# Patient Record
Sex: MALE | Race: WHITE | NOT HISPANIC OR LATINO | URBAN - METROPOLITAN AREA
[De-identification: names, ages, dates, MRNs, and addresses within clinical notes are randomized per-mention and may not be internally consistent; named-entity substitution may affect disease eponyms.]

---

## 2018-07-01 ENCOUNTER — EMERGENCY (EMERGENCY)
Facility: HOSPITAL | Age: 23
LOS: 1 days | Discharge: HOME | End: 2018-07-01
Attending: STUDENT IN AN ORGANIZED HEALTH CARE EDUCATION/TRAINING PROGRAM
Payer: SELF-PAY

## 2018-07-01 VITALS
SYSTOLIC BLOOD PRESSURE: 135 MMHG | DIASTOLIC BLOOD PRESSURE: 78 MMHG | OXYGEN SATURATION: 97 % | HEART RATE: 76 BPM | TEMPERATURE: 98 F | RESPIRATION RATE: 18 BRPM

## 2018-07-01 VITALS
DIASTOLIC BLOOD PRESSURE: 88 MMHG | OXYGEN SATURATION: 95 % | WEIGHT: 160.06 LBS | RESPIRATION RATE: 18 BRPM | TEMPERATURE: 98 F | HEART RATE: 80 BPM | HEIGHT: 69 IN | SYSTOLIC BLOOD PRESSURE: 142 MMHG

## 2018-07-01 LAB — ETHANOL SERPL-MCNC: 332 MG/DL — SIGNIFICANT CHANGE UP

## 2018-07-01 PROCEDURE — 72125 CT NECK SPINE W/O DYE: CPT

## 2018-07-01 PROCEDURE — 99285 EMERGENCY DEPT VISIT HI MDM: CPT | Mod: 25

## 2018-07-01 PROCEDURE — 36415 COLL VENOUS BLD VENIPUNCTURE: CPT

## 2018-07-01 PROCEDURE — 80307 DRUG TEST PRSMV CHEM ANLYZR: CPT

## 2018-07-01 PROCEDURE — 72125 CT NECK SPINE W/O DYE: CPT | Mod: 26

## 2018-07-01 PROCEDURE — 99284 EMERGENCY DEPT VISIT MOD MDM: CPT

## 2018-07-01 PROCEDURE — 70450 CT HEAD/BRAIN W/O DYE: CPT | Mod: 26

## 2018-07-01 PROCEDURE — 70450 CT HEAD/BRAIN W/O DYE: CPT

## 2018-07-01 NOTE — ED PROVIDER NOTE - MUSCULOSKELETAL, MLM
Spine appears normal, range of motion is not limited, no muscle or joint tenderness.  FROM all extremities x4.

## 2018-07-01 NOTE — ED ADULT NURSE REASSESSMENT NOTE - NS ED NURSE REASSESS COMMENT FT1
POC discussed with physician, pt to be discharged pending CT results, MD reports speaking with patient parents and Dad is to come pick pt up pending CT results.

## 2018-07-01 NOTE — ED ADULT TRIAGE NOTE - CHIEF COMPLAINT QUOTE
pt a+ox3, ALOOB admits to drinking alcohol today. pt states "I don't know who called or how I got here. I was on [place] drinking." pt denies any chest pain. clothing removed and placed in labeled belongings bag and secured in  closet. bottom lower lip swollen, pt states I don't know how it happened.

## 2018-07-01 NOTE — ED PROVIDER NOTE - ENMT, MLM
Moist mucous membranes.  Head: bruise to right cheek and left forehead.  Swelling to right lower lip, otherwise no facial tenderness

## 2018-07-01 NOTE — ED ADULT NURSE REASSESSMENT NOTE - NS ED NURSE REASSESS COMMENT FT1
MD at pt bedside test results explained to pt and parent who verbalize understanding, pt discharge to father care at this time, VSS, wheelchair provided.

## 2018-07-01 NOTE — ED PROVIDER NOTE - OBJECTIVE STATEMENT
21 y/o M, BIBA for alcohol intoxication from [place].  Pt states that he is unsure how or why he is in the hospital.  Also states that he is unsure how he sustained bruising and swelling to face.  Admits to drinking alcohol today.  Denies any physical complaints at this time including chest pain, facial pain, N/V/D, SOB, cough, or HA.

## 2018-07-01 NOTE — ED ADULT NURSE NOTE - CHIEF COMPLAINT QUOTE
pt a+ox3, ALOOB admits to drinking alcohol today. pt states "I don't know who called or how I got here. I was on [place] drinking." pt denies any chest pain. clothing removed and placed in labeled belongings bag and secured in  closet. bottom lower lip swollen, pt states I don't know how it happened.

## 2018-07-01 NOTE — ED PROVIDER NOTE - CARE PLAN
Principal Discharge DX:	Alcohol intoxication  Secondary Diagnosis:	Closed head injury, initial encounter

## 2018-07-01 NOTE — ED ADULT NURSE NOTE - OBJECTIVE STATEMENT
Pt reports "Shit happens then I ended up here", admits to drinking "only 3 drinks", alcohol on breath, bruises noted to pt right cheek and bottom lip, pt unable to re-call [facility], pt denies pain, offers no complaints, pleasant and cooperative to care, MD at pt bedside, POC discussed with pt, pt to go for a CT at this time

## 2018-07-01 NOTE — ED PROVIDER NOTE - CONSTITUTIONAL, MLM
normal... Well appearing, well nourished, awake, alert, oriented to person, place, unclear of situation and in no apparent distress.

## 2018-07-01 NOTE — ED ADULT NURSE REASSESSMENT NOTE - NS ED NURSE REASSESS COMMENT FT1
Pt father just arrived, POC discussed with Dad who verbalize understanding, MD made aware, awaiting MD to bed side.

## 2018-07-01 NOTE — ED PROVIDER NOTE - PROGRESS NOTE DETAILS
Pt's mother [phone] Called pt's family, message left to call back, pt will need to remain in ED pending sobriety

## 2018-07-02 RX ORDER — METHYLPHENIDATE HCL 5 MG
1 TABLET ORAL
Qty: 0 | Refills: 0 | COMMUNITY
